# Patient Record
Sex: FEMALE | Race: BLACK OR AFRICAN AMERICAN | NOT HISPANIC OR LATINO | URBAN - METROPOLITAN AREA
[De-identification: names, ages, dates, MRNs, and addresses within clinical notes are randomized per-mention and may not be internally consistent; named-entity substitution may affect disease eponyms.]

---

## 2022-11-17 ENCOUNTER — EMERGENCY (EMERGENCY)
Facility: HOSPITAL | Age: 32
LOS: 1 days | Discharge: HOME | End: 2022-11-17
Attending: STUDENT IN AN ORGANIZED HEALTH CARE EDUCATION/TRAINING PROGRAM | Admitting: STUDENT IN AN ORGANIZED HEALTH CARE EDUCATION/TRAINING PROGRAM

## 2022-11-17 VITALS
DIASTOLIC BLOOD PRESSURE: 70 MMHG | HEART RATE: 81 BPM | TEMPERATURE: 99 F | SYSTOLIC BLOOD PRESSURE: 138 MMHG | RESPIRATION RATE: 18 BRPM | OXYGEN SATURATION: 98 %

## 2022-11-17 PROCEDURE — 99283 EMERGENCY DEPT VISIT LOW MDM: CPT

## 2022-11-17 RX ORDER — IBUPROFEN 200 MG
600 TABLET ORAL ONCE
Refills: 0 | Status: COMPLETED | OUTPATIENT
Start: 2022-11-17 | End: 2022-11-17

## 2022-11-17 RX ADMIN — Medication 600 MILLIGRAM(S): at 13:28

## 2022-11-17 NOTE — ED PROVIDER NOTE - MUSCULOSKELETAL MINIMAL EXAM
posterior R thigh pain worsened with knee flexion against resistance and hip flexion; no pain with hip abduction/adduction/atraumatic/normal range of motion

## 2022-11-17 NOTE — ED PROVIDER NOTE - PATIENT PORTAL LINK FT
You can access the FollowMyHealth Patient Portal offered by [facility] by registering at the following website: [email or web address]. By joining [facility]’s FollowMyHealth portal, you will also be able to view your health information using other applications (apps) compatible with our system.

## 2022-11-17 NOTE — ED ADULT TRIAGE NOTE - CHIEF COMPLAINT QUOTE
Pt c/o R leg pain X couple of days. Pain starts at the top of R thigh to R knee, difficulty with ROM. Pt is coming from the gym, and now the pain is worse. Denies any Phx.

## 2022-11-17 NOTE — ED PROVIDER NOTE - NSFOLLOWUPINSTRUCTIONS_ED_ALL_ED_FT
Follow up with your primary care doctor    Continue anti-inflammatory medications such as ibuprofen 600mg every 6-8 hours as needed for symptoms. You can also apply over the counter lidocaine patches as product directed.    Compression with ACE bandage when on your feet. You can continue to use cane assistance to limit weight bearing if you find this more comfortable    Apply heat to area 10-15mins at a time, 3-4 times a day    Do mild stretching throughout the day    Return to ED with any new / worsening symptoms or anything else concerning to you.

## 2022-11-17 NOTE — ED PROVIDER NOTE - OBJECTIVE STATEMENT
32F no PMH p/w R thigh pain. 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 Attempted to go to gym on Monday but pain worsened on elliptical. States pain worse when trying to get up from sitting and noted to posterior thigh. Denies fall/trauma. No swelling or tenderness. No ecchymosis or other skin changes.

## 2022-11-17 NOTE — ED ADULT NURSE NOTE - OBJECTIVE STATEMENT
Pt is alert and ortientedx4, ambulatory at baseline. Pt came in stating she had right knee/leg pain. on exam pt has pain more in medial hamstring. 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 Pt noticed it was bothering her and then exacerbated it by working out on it. No swelling noted, pt has full range of motion with knee with slight pain on flexion. Pt ambulating safely with cane. Pt received Motrin and will be D/C. Pt is alert and ortientedx4, ambulatory at baseline. Pt came in stating she had right knee/leg pain. on exam pt has pain more in medial hamstring pain. 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 Pt noticed it was bothering her and then exacerbated it by working out on it. No swelling noted, pt has restricted range of motion with knee with slight pain on flexion. Pt ambulating safely with cane. Pt received Motrin and will be D/C.

## 2022-11-17 NOTE — ED PROVIDER NOTE - CLINICAL SUMMARY MEDICAL DECISION MAKING FREE TEXT BOX
32F p/w R thigh pain, mostly to posterior aspect with exam findings c/w hamstring strain/sprain. Ambulatory and weight bearing. No leg swelling. No ecchymosis, abrasion or other signs of trauma. Supportive management with anti-inflammatories, heat, compression